# Patient Record
Sex: MALE | Race: WHITE | Employment: FULL TIME | URBAN - NONMETROPOLITAN AREA
[De-identification: names, ages, dates, MRNs, and addresses within clinical notes are randomized per-mention and may not be internally consistent; named-entity substitution may affect disease eponyms.]

---

## 2021-06-29 ENCOUNTER — HOSPITAL ENCOUNTER (EMERGENCY)
Age: 46
Discharge: HOME | End: 2021-06-29
Attending: EMERGENCY MEDICINE

## 2021-06-29 VITALS
DIASTOLIC BLOOD PRESSURE: 82 MMHG | RESPIRATION RATE: 20 BRPM | OXYGEN SATURATION: 99 % | TEMPERATURE: 97.5 F | SYSTOLIC BLOOD PRESSURE: 154 MMHG | HEART RATE: 88 BPM

## 2021-06-29 DIAGNOSIS — S83.91XA SPRAIN OF RIGHT KNEE, UNSPECIFIED LIGAMENT, INITIAL ENCOUNTER: Primary | ICD-10-CM

## 2021-06-29 DIAGNOSIS — M25.561 ACUTE PAIN OF RIGHT KNEE: ICD-10-CM

## 2021-06-29 PROCEDURE — 99283 EMERGENCY DEPT VISIT LOW MDM: CPT

## 2021-06-29 NOTE — ED PROVIDER NOTES
[address]  [address] [place] [address] [address]  Phone: [phone]    eMERGENCY dEPARTMENT eNCOUnter           [address] [facility]       Chief Complaint   Patient presents with    Knee Pain     right knee felt knee twist6/24/21 work note requested       Nurses Notes reviewed and I agree except as noted in the HPI. HISTORY OF PRESENT ILLNESS    Alessandra Suresh is a 40 y.o. male who presented via private vehicle with the chief complaint mentioned above. He stated that he misses his right knee when he stepped off of a ladder 5 days ago. He developed mild pain and swelling at that time. He stated that his symptoms have resolved with rest, ice and ibuprofen. He was required from his work to have a work note prior to return. Patient stated that his symptoms have resolved. He denies other injuries. REVIEW OF SYSTEMS     None except as mentioned above. PAST MEDICAL HISTORY    has no past medical history on file. SURGICAL HISTORY      has a past surgical history that includes Finger surgery. CURRENT MEDICATIONS       Previous Medications    No medications on file       ALLERGIES     has No Known Allergies. FAMILY HISTORY     has no family status information on file. family history is not on file. SOCIAL HISTORY      reports that he has been smoking. He has been smoking about 1.00 pack per day. He has never used smokeless tobacco. He reports current alcohol use of about 24.0 standard drinks of alcohol per week. He reports that he does not use drugs. PHYSICAL EXAM     INITIAL VITALS:  temporal temperature is 97.5 °F (36.4 °C). His blood pressure is 154/82 (abnormal) and his pulse is 88. His respiration is 20 and oxygen saturation is 99%. Physical Exam  Vitals and nursing note reviewed. Constitutional:       General: He is not in acute distress. HENT:      Head: Atraumatic.    Musculoskeletal:      Comments: Examination of the right knee showed no swelling or obvious abnormality. Has normal, nonpainful, passive full range of motion. Has normal neurovascular examination of the right lower extremity. Neurological:      Mental Status: He is alert. DIFFERENTIAL DIAGNOSIS:       DIAGNOSTIC RESULTS         LABS:   Labs Reviewed - No data to display    EMERGENCY DEPARTMENT COURSE:   Vitals:    Vitals:    06/29/21 1402   BP: (!) 154/82   Pulse: 88   Resp: 20   Temp: 97.5 °F (36.4 °C)   TempSrc: Temporal   SpO2: 99%     He was reassured. He was given a work note to return to work. I discussed with him diagnosis and discharge instructions. FINAL IMPRESSION      1. Sprain of right knee, unspecified ligament, initial encounter    2. Acute pain of right knee          DISPOSITION/PLAN   Discharged home in good condition.     PATIENT REFERRED TO:    call [phone] to establish a family doctor          DISCHARGE MEDICATIONS:  New Prescriptions    No medications on file       (Please note that portions of this note were completed with a voice recognition program.  Efforts were made to edit the dictations but occasionally words are mis-transcribed.)    MD Hallie Hamilton MD  06/29/21 [id number]

## 2021-06-29 NOTE — LETTER
[place] [address] [facility]  Phone: [phone]               June 29, 2021    Patient: Castillo Mesa   YOB: 1975   Date of Visit: 6/29/2021       To Whom It May Concern:    Kelly Aguilar was seen and treated in our emergency department on 6/29/2021. He may return to work today without restrictions.       Sincerely,       Les Lopez MD         Signature:__________________________________

## 2021-06-29 NOTE — ED TRIAGE NOTES
Pt arrives to ER requesting a work note to return to work. On 6/24/21 was stepping off a ladder and twisted right knee. Knee swollen, took ibuprofen and rested knee, missed work on 6/25/21. Went to work on 6/28/21, boss states needs note to return.  No PCP, information given

## 2021-06-29 NOTE — ED NOTES
Advised of wait time. Call light in reach, side rail up x1.  Dr Sayra Rivas aware of pt     Kristin Cole, ROSSANA  06/29/21 [id number]